# Patient Record
Sex: MALE | Race: WHITE | NOT HISPANIC OR LATINO | URBAN - METROPOLITAN AREA
[De-identification: names, ages, dates, MRNs, and addresses within clinical notes are randomized per-mention and may not be internally consistent; named-entity substitution may affect disease eponyms.]

---

## 2018-01-14 NOTE — PROGRESS NOTES
Assessment    1  Hordeolum externum of right eye (373 11) (H00 013)    Plan  Hordeolum externum of right eye    · Cephalexin 250 MG Oral Capsule; TAKE 1 CAPSULE 4 TIMES DAILY   · Kera Carrasquillo MD (Ophthalmology) Physician Referral  Consult  Status: Hold For -  Scheduling  Requested for: [id number]  Care Summary provided  : Yes    Discussion/Summary    Looks like a stye although a bit out of the ordinary  Use Keflex 254 times a day  Use warm soaks on the eye 4 times a day  Ophthalmology referral for further assessment  I will give you the number of [facility]; the referral paper since Dr Varsha Paula but anyone will do  History of Present Illness  HPI: This man is employed at [employer]  On January 22, he was moving objects, and he noted that at one time this activity "kicked out dust "  3 days later, he began to notice a painless swelling of the right upper eyelid  This has persisted  He has no sensation of foreign body  He has no systemic symptoms  He has no loss of daily  He has no chronic health problems  There is a small nontender nodule in the right upper eyelid      Review of Systems    Constitutional: no chills  Integumentary: no rashes  Past Medical History  Active Problems And Past Medical History Reviewed: The active problems and past medical history were reviewed and updated today  Physical Exam    Constitutional   General appearance: No acute distress, well appearing and well nourished  Eyes   Conjunctiva and lids: Abnormal   A 2 mm x 1 mm oval tense masses noted centrally in the right upper eyelid; it is not adjacent to the margin and does not appear to be pointing  There is no drainage  The eye itself appears normal with no hyperemia of the sclera, no icterus, and normal pupillary reactions  Pulmonary   Respiratory effort: No increased work of breathing or signs of respiratory distress      Psychiatric   Orientation to person, place and time: Normal     Mood and affect: Normal          Signatures   Electronically signed by : SIA Wang ; Feb 4 2016 10:24AM EST                       (Author)

## 2024-06-07 ENCOUNTER — TELEPHONE (OUTPATIENT)
Dept: SURGERY | Facility: CLINIC | Age: 39
End: 2024-06-07